# Patient Record
Sex: MALE | Race: BLACK OR AFRICAN AMERICAN | URBAN - METROPOLITAN AREA
[De-identification: names, ages, dates, MRNs, and addresses within clinical notes are randomized per-mention and may not be internally consistent; named-entity substitution may affect disease eponyms.]

---

## 2023-10-17 ENCOUNTER — TELEPHONE ENCOUNTER (OUTPATIENT)
Dept: URBAN - METROPOLITAN AREA CLINIC 46 | Facility: CLINIC | Age: 33
End: 2023-10-17

## 2023-10-17 ENCOUNTER — OFFICE VISIT (OUTPATIENT)
Dept: URBAN - METROPOLITAN AREA CLINIC 44 | Facility: CLINIC | Age: 33
End: 2023-10-17
Payer: COMMERCIAL

## 2023-10-17 VITALS
HEIGHT: 71 IN | BODY MASS INDEX: 34.72 KG/M2 | HEART RATE: 80 BPM | DIASTOLIC BLOOD PRESSURE: 104 MMHG | TEMPERATURE: 99.6 F | WEIGHT: 248 LBS | SYSTOLIC BLOOD PRESSURE: 148 MMHG

## 2023-10-17 DIAGNOSIS — R10.13 DYSPEPSIA: ICD-10-CM

## 2023-10-17 PROCEDURE — 99204 OFFICE O/P NEW MOD 45 MIN: CPT | Performed by: INTERNAL MEDICINE

## 2023-10-17 RX ORDER — CIPROFLOXACIN HYDROCHLORIDE 500 MG/1
1 TABLET TABLET, FILM COATED ORAL
Qty: 20 TABLET | Refills: 0 | OUTPATIENT
Start: 2023-10-17 | End: 2023-10-27

## 2023-10-17 RX ORDER — OMEPRAZOLE 40 MG/1
1 CAPSULE 30 MINUTES BEFORE MORNING MEAL CAPSULE, DELAYED RELEASE ORAL ONCE A DAY
Qty: 30 | Refills: 3 | OUTPATIENT
Start: 2023-10-17

## 2023-10-17 RX ORDER — OCTISALATE, AVOBENZONE, HOMOSALATE, AND OCTOCRYLENE 29.4; 29.4; 49; 25.48 MG/ML; MG/ML; MG/ML; MG/ML
AS DIRECTED LOTION TOPICAL
Qty: 60 | Refills: 0 | OUTPATIENT
Start: 2023-10-17 | End: 2023-12-15

## 2023-10-17 NOTE — HPI-TODAY'S VISIT:
Pt with recent multiple neurologic complaints: malaise; spasm in hands; tremor; palpitations. PCP referred pt to Neurology and workup for MS started with MRI brain and EMG of upper extremities. CBC with low WBC 2.9; Hgb 1; MCV 90; ; TSH 2.2; LFT's wnl. test pending.   Also states 2 years of intermittent Gi complaints. burning after certain food; mild bloating and pressure at other times. No constipation or diarrhea. No hives or tongue swelling or rash. No dysphagia. No weight changes. No family hx of food allergies or IBD. Average risk for CRC

## 2024-01-22 ENCOUNTER — DASHBOARD ENCOUNTERS (OUTPATIENT)
Age: 34
End: 2024-01-22

## 2024-01-23 ENCOUNTER — OFFICE VISIT (OUTPATIENT)
Dept: URBAN - METROPOLITAN AREA CLINIC 44 | Facility: CLINIC | Age: 34
End: 2024-01-23

## 2024-01-23 RX ORDER — OMEPRAZOLE 40 MG/1
1 CAPSULE 30 MINUTES BEFORE MORNING MEAL CAPSULE, DELAYED RELEASE ORAL ONCE A DAY
Qty: 30 | Refills: 3 | Status: ACTIVE | COMMUNITY
Start: 2023-10-17